# Patient Record
Sex: MALE | Race: BLACK OR AFRICAN AMERICAN | Employment: UNEMPLOYED | URBAN - METROPOLITAN AREA
[De-identification: names, ages, dates, MRNs, and addresses within clinical notes are randomized per-mention and may not be internally consistent; named-entity substitution may affect disease eponyms.]

---

## 2017-11-27 ENCOUNTER — HOSPITAL ENCOUNTER (EMERGENCY)
Facility: HOSPITAL | Age: 22
Discharge: HOME | End: 2017-11-27
Attending: EMERGENCY MEDICINE

## 2017-11-27 VITALS
HEART RATE: 64 BPM | BODY MASS INDEX: 24.99 KG/M2 | TEMPERATURE: 98 F | RESPIRATION RATE: 16 BRPM | WEIGHT: 164.88 LBS | HEIGHT: 68 IN | DIASTOLIC BLOOD PRESSURE: 89 MMHG | SYSTOLIC BLOOD PRESSURE: 140 MMHG | OXYGEN SATURATION: 98 %

## 2017-11-27 DIAGNOSIS — J03.00 STREPTOCOCCAL TONSILLITIS: Primary | ICD-10-CM

## 2017-11-27 PROCEDURE — 99283 EMERGENCY DEPT VISIT LOW MDM: CPT

## 2017-11-27 PROCEDURE — 96372 THER/PROPH/DIAG INJ SC/IM: CPT

## 2017-11-27 PROCEDURE — 87430 STREP A AG IA: CPT | Performed by: EMERGENCY MEDICINE

## 2017-11-27 RX ORDER — KETOROLAC TROMETHAMINE 30 MG/ML
60 INJECTION, SOLUTION INTRAMUSCULAR; INTRAVENOUS ONCE
Status: COMPLETED | OUTPATIENT
Start: 2017-11-27 | End: 2017-11-27

## 2017-11-27 RX ORDER — AMOXICILLIN 500 MG/1
500 TABLET, FILM COATED ORAL 3 TIMES DAILY
Qty: 21 TABLET | Refills: 0 | Status: SHIPPED | OUTPATIENT
Start: 2017-11-27 | End: 2017-12-04

## 2017-11-27 RX ORDER — DEXAMETHASONE SODIUM PHOSPHATE 4 MG/ML
10 VIAL (ML) INJECTION ONCE
Status: COMPLETED | OUTPATIENT
Start: 2017-11-27 | End: 2017-11-27

## 2017-11-28 NOTE — ED PROVIDER NOTES
Patient Seen in: [facility] Emergency Department    History   Patient presents with:  Sore Throat    Stated Complaint: sore throat can not swallow or talk    HPI    58-year-old male here for evaluation of a sore throat, worsening for the last 2 or 3 of motion. Neck supple. Cardiovascular: Normal rate, regular rhythm, normal heart sounds and intact distal pulses. Pulmonary/Chest: Effort normal and breath sounds normal. No stridor. Abdominal: Soft.  Bowel sounds are normal.   Neurological: Pt is a

## 2022-11-14 ENCOUNTER — APPOINTMENT (OUTPATIENT)
Dept: GENERAL RADIOLOGY | Facility: HOSPITAL | Age: 27
End: 2022-11-14
Payer: MEDICAID

## 2022-11-14 ENCOUNTER — HOSPITAL ENCOUNTER (EMERGENCY)
Facility: HOSPITAL | Age: 27
Discharge: LEFT BEFORE DISCHARGE | End: 2022-11-14
Payer: MEDICAID

## 2022-11-14 VITALS
DIASTOLIC BLOOD PRESSURE: 81 MMHG | SYSTOLIC BLOOD PRESSURE: 135 MMHG | TEMPERATURE: 104 F | OXYGEN SATURATION: 93 % | HEART RATE: 113 BPM | RESPIRATION RATE: 18 BRPM | BODY MASS INDEX: 31.1 KG/M2 | HEIGHT: 69 IN | WEIGHT: 210 LBS

## 2022-11-14 LAB — SARS-COV-2 RNA RESP QL NAA+PROBE: NOT DETECTED

## 2022-11-14 PROCEDURE — 71045 X-RAY EXAM CHEST 1 VIEW: CPT

## 2022-11-14 RX ORDER — IBUPROFEN 600 MG/1
600 TABLET ORAL ONCE
Status: COMPLETED | OUTPATIENT
Start: 2022-11-14 | End: 2022-11-14

## 2022-11-14 RX ORDER — ACETAMINOPHEN 500 MG
1000 TABLET ORAL ONCE
Status: COMPLETED | OUTPATIENT
Start: 2022-11-14 | End: 2022-11-14

## 2022-11-14 NOTE — ED INITIAL ASSESSMENT (HPI)
Patient presents to the ED with c/o cough and myesha that started last night. He states that he also had a headache and chills.

## 2022-11-20 ENCOUNTER — HOSPITAL ENCOUNTER (EMERGENCY)
Facility: HOSPITAL | Age: 27
Discharge: HOME | End: 2022-11-20
Attending: EMERGENCY MEDICINE
Payer: MEDICAID

## 2022-11-20 VITALS
TEMPERATURE: 99 F | HEART RATE: 94 BPM | BODY MASS INDEX: 31.84 KG/M2 | RESPIRATION RATE: 20 BRPM | OXYGEN SATURATION: 96 % | HEIGHT: 69 IN | DIASTOLIC BLOOD PRESSURE: 86 MMHG | WEIGHT: 215 LBS | SYSTOLIC BLOOD PRESSURE: 137 MMHG

## 2022-11-20 DIAGNOSIS — J18.9 COMMUNITY ACQUIRED PNEUMONIA OF LEFT LUNG, UNSPECIFIED PART OF LUNG: Primary | ICD-10-CM

## 2022-11-20 PROCEDURE — 99283 EMERGENCY DEPT VISIT LOW MDM: CPT

## 2022-11-20 RX ORDER — BENZONATATE 100 MG/1
100 CAPSULE ORAL 3 TIMES DAILY PRN
Qty: 30 CAPSULE | Refills: 0 | Status: SHIPPED | OUTPATIENT
Start: 2022-11-20 | End: 2022-12-20

## 2022-11-20 RX ORDER — ALBUTEROL SULFATE 90 UG/1
2 AEROSOL, METERED RESPIRATORY (INHALATION) EVERY 4 HOURS PRN
Qty: 1 EACH | Refills: 0 | Status: SHIPPED | OUTPATIENT
Start: 2022-11-20 | End: 2022-12-20

## 2022-11-20 RX ORDER — AZITHROMYCIN 250 MG/1
TABLET, FILM COATED ORAL
Qty: 6 TABLET | Refills: 0 | Status: SHIPPED | OUTPATIENT
Start: 2022-11-20 | End: 2022-11-25

## 2022-11-20 NOTE — ED INITIAL ASSESSMENT (HPI)
Pt to ED for multiple complaints: cough; shortness of breath; post-tussive vomiting; loose stools (not on antibiotics); chest pain - onset 5-6 days.

## 2022-11-20 NOTE — DISCHARGE INSTRUCTIONS
During your visit at the [facility] emergency department yesterday you were diagnosed with a left lower lobe pneumonia. We are re-prescribing antibiotic, azithromycin, that was prescribed yesterday. Please complete this antibiotic course as instructed, drink plenty of fluids, use Tylenol or ibuprofen for discomfort. He was very important that you follow-up for a repeat chest x-ray to ensure resolution of the pneumonia seen here today. This should be performed in 4 to 6 weeks. Return to the ER if your symptoms worsen or change or if you have any other new concerning symptoms.

## 2023-07-02 ENCOUNTER — HOSPITAL ENCOUNTER (EMERGENCY)
Facility: HOSPITAL | Age: 28
Discharge: HOME | End: 2023-07-02
Attending: EMERGENCY MEDICINE
Payer: MEDICAID

## 2023-07-02 VITALS
RESPIRATION RATE: 16 BRPM | HEART RATE: 72 BPM | SYSTOLIC BLOOD PRESSURE: 123 MMHG | DIASTOLIC BLOOD PRESSURE: 89 MMHG | TEMPERATURE: 97 F | OXYGEN SATURATION: 98 % | BODY MASS INDEX: 32 KG/M2 | WEIGHT: 213.88 LBS

## 2023-07-02 DIAGNOSIS — L30.1 DYSHIDROTIC ECZEMA: Primary | ICD-10-CM

## 2023-07-02 DIAGNOSIS — L24.3 IRRITANT CONTACT DERMATITIS DUE TO COSMETICS: ICD-10-CM

## 2023-07-02 PROCEDURE — 99284 EMERGENCY DEPT VISIT MOD MDM: CPT

## 2023-07-02 PROCEDURE — 99283 EMERGENCY DEPT VISIT LOW MDM: CPT

## 2023-07-02 RX ORDER — PREDNISONE 20 MG/1
20 TABLET ORAL 2 TIMES DAILY
Qty: 10 TABLET | Refills: 0 | Status: SHIPPED | OUTPATIENT
Start: 2023-07-02 | End: 2023-07-07

## 2023-07-02 RX ORDER — PREDNISONE 20 MG/1
40 TABLET ORAL ONCE
Status: COMPLETED | OUTPATIENT
Start: 2023-07-02 | End: 2023-07-02

## 2023-07-02 RX ORDER — FEXOFENADINE HCL 180 MG/1
180 TABLET ORAL DAILY
Qty: 20 TABLET | Refills: 0 | Status: SHIPPED | OUTPATIENT
Start: 2023-07-02 | End: 2023-07-22

## 2023-07-02 RX ORDER — TRIAMCINOLONE ACETONIDE 1 MG/G
1 CREAM TOPICAL 2 TIMES DAILY
Qty: 80 G | Refills: 0 | Status: SHIPPED | OUTPATIENT
Start: 2023-07-02

## 2023-10-27 ENCOUNTER — HOSPITAL ENCOUNTER (EMERGENCY)
Facility: HOSPITAL | Age: 28
Discharge: HOME | End: 2023-10-27
Attending: EMERGENCY MEDICINE
Payer: MEDICAID

## 2023-10-27 VITALS
BODY MASS INDEX: 31.1 KG/M2 | HEIGHT: 69 IN | HEART RATE: 68 BPM | WEIGHT: 210 LBS | RESPIRATION RATE: 16 BRPM | DIASTOLIC BLOOD PRESSURE: 75 MMHG | OXYGEN SATURATION: 100 % | SYSTOLIC BLOOD PRESSURE: 115 MMHG | TEMPERATURE: 98 F

## 2023-10-27 DIAGNOSIS — S61.401A AVULSION OF SKIN OF RIGHT HAND, INITIAL ENCOUNTER: Primary | ICD-10-CM

## 2023-10-27 PROCEDURE — 99283 EMERGENCY DEPT VISIT LOW MDM: CPT

## 2023-10-27 PROCEDURE — 90471 IMMUNIZATION ADMIN: CPT

## 2023-10-27 NOTE — ED INITIAL ASSESSMENT (HPI)
Pt to ED via EMS in police custody. Pt w/ finger lac to finger of R hand. Bleeding controlled at this time.

## (undated) NOTE — ED AVS SNAPSHOT
Jatin Rubio   MRN: [id number]    Department:  [facility] Emergency Department   Date of Visit:  11/27/2017           Disclosure     Insurance plans vary and the physician(s) referred by the ER may not be covered by your plan.  Please contact your i tell this physician (or your personal doctor if your instructions are to return to your personal doctor) about any new or lasting problems. The primary care or specialist physician will see patients referred from the [facility] Emergency Department.  Ponce Hua

## (undated) NOTE — LETTER
Date & Time: 10/27/2023, 4:04 PM  Patient: Taylor Monique  Encounter Provider(s):    Blas Campbell MD          I have seen Taylor Ndiayes 2/8/1995 and found him medically cleared for incarceration with [facility] Department.        _____________________________  Physician